# Patient Record
Sex: MALE | Race: WHITE | NOT HISPANIC OR LATINO | Employment: OTHER | URBAN - METROPOLITAN AREA
[De-identification: names, ages, dates, MRNs, and addresses within clinical notes are randomized per-mention and may not be internally consistent; named-entity substitution may affect disease eponyms.]

---

## 2023-10-31 PROBLEM — E66.811 OBESITY, CLASS I, BMI 30-34.9: Status: ACTIVE | Noted: 2022-01-20

## 2023-10-31 PROBLEM — I10 HTN (HYPERTENSION): Status: ACTIVE | Noted: 2023-07-25

## 2023-10-31 PROBLEM — M17.12 PRIMARY OSTEOARTHRITIS OF LEFT KNEE: Status: ACTIVE | Noted: 2021-08-24

## 2023-10-31 PROBLEM — K21.9 GERD (GASTROESOPHAGEAL REFLUX DISEASE): Status: ACTIVE | Noted: 2023-07-25

## 2023-10-31 PROBLEM — R93.1 ABNORMAL ECHOCARDIOGRAPHY: Status: ACTIVE | Noted: 2023-10-31

## 2023-10-31 PROBLEM — Z96.652 HISTORY OF TOTAL KNEE REPLACEMENT, LEFT: Status: ACTIVE | Noted: 2023-09-06

## 2023-10-31 PROBLEM — B35.1 ONYCHOMYCOSIS: Status: ACTIVE | Noted: 2021-05-10

## 2023-10-31 PROBLEM — E66.9 OBESITY, CLASS I, BMI 30-34.9: Status: ACTIVE | Noted: 2022-01-20

## 2023-10-31 PROBLEM — I25.10 ARTERIOSCLEROSIS OF CORONARY ARTERY: Status: ACTIVE | Noted: 2023-10-31

## 2023-10-31 PROBLEM — L60.3 NAIL DYSTROPHY: Status: ACTIVE | Noted: 2023-09-06

## 2023-10-31 PROBLEM — I50.22 CHRONIC SYSTOLIC HEART FAILURE (MULTI): Status: ACTIVE | Noted: 2021-05-10

## 2023-10-31 PROBLEM — M85.80 OSTEOPENIA, SENILE: Status: ACTIVE | Noted: 2021-05-10

## 2023-10-31 PROBLEM — E78.5 HYPERLIPIDEMIA: Status: ACTIVE | Noted: 2023-10-31

## 2023-10-31 PROBLEM — Z95.810 IMPLANTABLE CARDIOVERTER-DEFIBRILLATOR (ICD) IN SITU: Status: ACTIVE | Noted: 2023-10-31

## 2023-10-31 PROBLEM — I25.2 HISTORY OF MI (MYOCARDIAL INFARCTION): Status: ACTIVE | Noted: 2021-05-10

## 2023-10-31 PROBLEM — Z86.79 HISTORY OF CARDIOMYOPATHY: Status: ACTIVE | Noted: 2023-10-31

## 2023-10-31 PROBLEM — R26.81 UNSTEADINESS ON FEET: Status: ACTIVE | Noted: 2023-09-06

## 2023-11-28 ENCOUNTER — OFFICE VISIT (OUTPATIENT)
Dept: CARDIOLOGY | Facility: CLINIC | Age: 78
End: 2023-11-28
Payer: MEDICARE

## 2023-11-28 ENCOUNTER — TELEPHONE (OUTPATIENT)
Dept: CARDIOLOGY | Facility: CLINIC | Age: 78
End: 2023-11-28

## 2023-11-28 VITALS
SYSTOLIC BLOOD PRESSURE: 124 MMHG | BODY MASS INDEX: 31.02 KG/M2 | OXYGEN SATURATION: 97 % | DIASTOLIC BLOOD PRESSURE: 72 MMHG | HEIGHT: 72 IN | WEIGHT: 229 LBS | HEART RATE: 67 BPM

## 2023-11-28 DIAGNOSIS — E78.5 DYSLIPIDEMIA: ICD-10-CM

## 2023-11-28 DIAGNOSIS — I42.8 NONISCHEMIC CARDIOMYOPATHY (MULTI): Primary | ICD-10-CM

## 2023-11-28 DIAGNOSIS — I50.22 CHRONIC SYSTOLIC HEART FAILURE (MULTI): Primary | ICD-10-CM

## 2023-11-28 PROCEDURE — 3074F SYST BP LT 130 MM HG: CPT | Performed by: INTERNAL MEDICINE

## 2023-11-28 PROCEDURE — 99214 OFFICE O/P EST MOD 30 MIN: CPT | Performed by: INTERNAL MEDICINE

## 2023-11-28 PROCEDURE — 1126F AMNT PAIN NOTED NONE PRSNT: CPT | Performed by: INTERNAL MEDICINE

## 2023-11-28 PROCEDURE — 1160F RVW MEDS BY RX/DR IN RCRD: CPT | Performed by: INTERNAL MEDICINE

## 2023-11-28 PROCEDURE — 1036F TOBACCO NON-USER: CPT | Performed by: INTERNAL MEDICINE

## 2023-11-28 PROCEDURE — 1159F MED LIST DOCD IN RCRD: CPT | Performed by: INTERNAL MEDICINE

## 2023-11-28 PROCEDURE — 3078F DIAST BP <80 MM HG: CPT | Performed by: INTERNAL MEDICINE

## 2023-11-28 RX ORDER — SPIRONOLACTONE 25 MG/1
12.5 TABLET ORAL DAILY
Qty: 45 TABLET | Refills: 3 | Status: SHIPPED | OUTPATIENT
Start: 2023-11-28 | End: 2024-11-27

## 2023-11-28 ASSESSMENT — ENCOUNTER SYMPTOMS
DEPRESSION: 0
LOSS OF SENSATION IN FEET: 0
OCCASIONAL FEELINGS OF UNSTEADINESS: 1

## 2023-11-28 ASSESSMENT — PAIN SCALES - GENERAL: PAINLEVEL: 0-NO PAIN

## 2023-11-28 NOTE — PROGRESS NOTES
Chief Complaint:   No chief complaint on file.     History Of Present Illness:    Mike Reid is a 78 y.o. male with a history of hypertrophic cardiomyopathy (chronic systolic heart failure), CAD s/p PCI of the LAD 2007, abnormal ECG, mitral regurgitation and ICD in situ (follows with Dr Guido) here for follow-up.     Stopped coughing when he stopped his losartan. He also stopped his Farxiga but doesn't think the cough stopped until he stopped the losartan.    Denies any chest pain, shortness of breath, lightheadedness or palpitations.  Does have mild left lower extremity swelling ever since his knee surgery but is improving.     Echocardiogram 12/3/2021: Technically difficult study with LV global hypokinesis and EF 30%. Grade 1 diastolic dysfunction.     Echocardiogram 8/19/14 demonstrating moderate to severely increased left ventricular size with moderate to severely reduced function, EF 25-30%. Severely increased ventricular wall thickness. Grade 1 diastolic dysfunction. RVSP 31 mmHg.     Catheterization 5/26/07 for an acute anterior lateral MI. Thrombectomy of the LAD followed by stenting with a 4.0 x 28 mm Vision BMS.      Past Medical History:  He has a past medical history of Abnormal findings on diagnostic imaging of heart and coronary circulation (03/23/2015), Old myocardial infarction (03/23/2015), Personal history of other diseases of the circulatory system (03/23/2015), Personal history of other diseases of the circulatory system (03/23/2015), and Presence of automatic (implantable) cardiac defibrillator (03/23/2015).    Past Surgical History:  He has a past surgical history that includes Cardiac pacemaker placement (03/23/2015).      Social History:  He reports that he has never smoked. He has never used smokeless tobacco. He reports that he does not drink alcohol and does not use drugs.    Family History:  No family history on file.     Allergies:  Ace inhibitors, Sacubitril-valsartan, and  "Terbinafine    Outpatient Medications:  Current Outpatient Medications   Medication Instructions    aspirin 81 mg, oral    atorvastatin (Lipitor) 80 mg tablet 1 tablet, oral, Daily    hydrocortisone 2.5 % cream APPLY TO AFFECTED AREA DAILY AS DIRECTED.    metoprolol succinate XL (Toprol-XL) 25 mg 24 hr tablet 1 tablet, oral, Daily    nitroglycerin (Nitrostat) 0.4 mg SL tablet [The details of the medication are not available because there are pending changes by a home health clinician.]       Last Recorded Vitals:  Visit Vitals  /72 (BP Location: Left arm, Patient Position: Sitting)   Pulse 67   Ht 1.829 m (6')   Wt 104 kg (229 lb)   SpO2 97%   BMI 31.06 kg/m²   Smoking Status Never   BSA 2.3 m²      LASTWT(3):   Wt Readings from Last 3 Encounters:   11/28/23 104 kg (229 lb)   05/16/23 110 kg (242 lb)   05/17/22 107 kg (236 lb)       Physical Exam:  In general: alert and in no acute distress.   HEENT: Carotid upstrokes normal with no bruits. JVP is normal.   Pulmonary: Clear to auscultation bilaterally.  Cardiovascular: S1,S2, regular. No appreciable murmurs, rubs or gallops.   Lower extremities: Warm. 2+ distal pulses.  Trivial left lower extremity edema.     Last Labs:  CBC -  No results for input(s): \"WBC\", \"HGB\", \"HCT\", \"PLT\", \"MCV\" in the last 79809 hours.    CMP -  No results for input(s): \"NA\", \"K\", \"CL\", \"CO2\", \"ANIONGAP\", \"BUN\", \"CREATININE\", \"EGFR\", \"MG\" in the last 46729 hours.  No results for input(s): \"ALBUMIN\", \"ALKPHOS\", \"ALT\", \"AST\", \"BILITOT\" in the last 66986 hours.    No lab exists for component: \"CA\"    LIPID PANEL -   No results for input(s): \"CHOL\", \"LDLF\", \"HDL\", \"TRIG\" in the last 66595 hours.    Recent Labs     08/02/23  1237 08/25/22  0838 05/21/21  1200   HGBA1C 5.2 5.5 5.6           Assessment/Plan   1) CAD: Stable from a symptom standpoint. At this time no need for routine testing based on the ISCHEMIA Trial     2) chronic systolic heart failure: Euvolemic. LV function remains at " 30%.  Unable to tolerate Entresto due to cost and losartan due to cough.  Unlikely to be able to tolerate ACE inhibitors due to cough as well.    Continue metoprolol.  Restart Farxiga.  If tolerating this over the next month then I would ask him to call the office so we can send in the prescription for spironolactone 12.5 mg daily.  I would ask him to get a basic metabolic panel 1 week later.     3) dyslipidemia: continue statin therapy.     4) follow-up: 6 months or sooner if needed.       Tano Hou MD

## 2023-11-28 NOTE — TELEPHONE ENCOUNTER
Mr. Reid stopped at pharmacy and found that the Farxiga was $445/3 month supply.  He wanted to let you know that he will not be starting this medication.

## 2024-05-30 ENCOUNTER — APPOINTMENT (OUTPATIENT)
Dept: CARDIOLOGY | Facility: CLINIC | Age: 79
End: 2024-05-30
Payer: MEDICARE

## 2024-06-12 DIAGNOSIS — Z86.79 HISTORY OF CARDIOMYOPATHY: Primary | ICD-10-CM

## 2024-06-12 RX ORDER — METOPROLOL SUCCINATE 25 MG/1
25 TABLET, EXTENDED RELEASE ORAL DAILY
Qty: 90 TABLET | Refills: 3 | Status: SHIPPED | OUTPATIENT
Start: 2024-06-12 | End: 2025-06-12

## 2024-08-19 DIAGNOSIS — E78.5 HYPERLIPIDEMIA, UNSPECIFIED HYPERLIPIDEMIA TYPE: Primary | ICD-10-CM

## 2024-08-19 RX ORDER — ATORVASTATIN CALCIUM 80 MG/1
80 TABLET, FILM COATED ORAL DAILY
Qty: 90 TABLET | Refills: 3 | Status: SHIPPED | OUTPATIENT
Start: 2024-08-19

## 2024-08-22 DIAGNOSIS — I50.22 CHRONIC SYSTOLIC HEART FAILURE (MULTI): ICD-10-CM

## 2024-08-23 RX ORDER — SPIRONOLACTONE 25 MG/1
12.5 TABLET ORAL DAILY
Qty: 45 TABLET | Refills: 3 | Status: SHIPPED | OUTPATIENT
Start: 2024-08-23 | End: 2025-08-23

## 2025-06-03 DIAGNOSIS — Z86.79 HISTORY OF CARDIOMYOPATHY: ICD-10-CM

## 2025-06-03 RX ORDER — METOPROLOL SUCCINATE 25 MG/1
25 TABLET, EXTENDED RELEASE ORAL DAILY
Qty: 90 TABLET | Refills: 3 | Status: SHIPPED | OUTPATIENT
Start: 2025-06-03

## 2025-08-12 DIAGNOSIS — E78.5 HYPERLIPIDEMIA, UNSPECIFIED HYPERLIPIDEMIA TYPE: ICD-10-CM

## 2025-08-12 RX ORDER — ATORVASTATIN CALCIUM 80 MG/1
80 TABLET, FILM COATED ORAL DAILY
Qty: 90 TABLET | Refills: 3 | Status: SHIPPED | OUTPATIENT
Start: 2025-08-12

## 2025-08-18 PROBLEM — Z86.79 HISTORY OF CARDIOMYOPATHY: Status: RESOLVED | Noted: 2023-10-31 | Resolved: 2025-08-18

## 2025-08-18 PROBLEM — E78.5 HYPERLIPIDEMIA: Status: RESOLVED | Noted: 2023-10-31 | Resolved: 2025-08-18

## 2025-08-18 PROBLEM — I10 HTN (HYPERTENSION): Status: RESOLVED | Noted: 2023-07-25 | Resolved: 2025-08-18

## 2025-08-18 PROBLEM — I42.8 NONISCHEMIC CARDIOMYOPATHY (MULTI): Status: RESOLVED | Noted: 2023-11-28 | Resolved: 2025-08-18

## 2025-08-19 ENCOUNTER — OFFICE VISIT (OUTPATIENT)
Dept: CARDIOLOGY | Facility: CLINIC | Age: 80
End: 2025-08-19
Payer: COMMERCIAL

## 2025-08-19 VITALS
OXYGEN SATURATION: 96 % | HEIGHT: 72 IN | DIASTOLIC BLOOD PRESSURE: 50 MMHG | WEIGHT: 236 LBS | BODY MASS INDEX: 31.97 KG/M2 | HEART RATE: 67 BPM | SYSTOLIC BLOOD PRESSURE: 126 MMHG

## 2025-08-19 DIAGNOSIS — Z95.810 IMPLANTABLE CARDIOVERTER-DEFIBRILLATOR (ICD) IN SITU: ICD-10-CM

## 2025-08-19 DIAGNOSIS — E78.5 DYSLIPIDEMIA: ICD-10-CM

## 2025-08-19 DIAGNOSIS — I50.22 CHRONIC SYSTOLIC HEART FAILURE: ICD-10-CM

## 2025-08-19 DIAGNOSIS — I25.10 ARTERIOSCLEROSIS OF CORONARY ARTERY: Primary | ICD-10-CM

## 2025-08-19 PROCEDURE — 99202 OFFICE O/P NEW SF 15 MIN: CPT

## 2025-08-19 PROCEDURE — 1160F RVW MEDS BY RX/DR IN RCRD: CPT | Performed by: INTERNAL MEDICINE

## 2025-08-19 PROCEDURE — 93005 ELECTROCARDIOGRAM TRACING: CPT | Performed by: INTERNAL MEDICINE

## 2025-08-19 PROCEDURE — 99214 OFFICE O/P EST MOD 30 MIN: CPT | Performed by: INTERNAL MEDICINE

## 2025-08-19 PROCEDURE — 93010 ELECTROCARDIOGRAM REPORT: CPT | Performed by: INTERNAL MEDICINE

## 2025-08-19 PROCEDURE — 1159F MED LIST DOCD IN RCRD: CPT | Performed by: INTERNAL MEDICINE
